# Patient Record
(demographics unavailable — no encounter records)

---

## 2024-12-23 NOTE — HISTORY OF PRESENT ILLNESS
[No Pertinent Cardiac History] : no history of aortic stenosis, atrial fibrillation, coronary artery disease, recent myocardial infarction, or implantable device/pacemaker [No Pertinent Pulmonary History] : no history of asthma, COPD, sleep apnea, or smoking [No Adverse Anesthesia Reaction] : no adverse anesthesia reaction in self or family member [Diabetes] : diabetes [FreeTextEntry1] : NASAL ENDOSCOPY AND TISSUE EXCISION [FreeTextEntry3] : DR MURPHY [FreeTextEntry2] : 1/9/25 [FreeTextEntry4] : COMES FOR PREOP  CC OF COUGH WITH CLEAR MUCUS FOR 3 DAYS

## 2024-12-23 NOTE — ASSESSMENT
[Patient Optimized for Surgery] : Patient optimized for surgery [No Further Testing Recommended] : no further testing recommended [Continue medications as is] : Continue current medications [As per surgery] : as per surgery [FreeTextEntry4] : 65 Y OLD FEM WITH PMX OF BIPOLAR DISORDER AND DM AT ACCEPTABLE RISK FOR SURGERY  ACUTE BRONCHITIS SX= AUGMENTIN ,BENZONATATE AND PREDNISONE 20 MG ORDERED

## 2024-12-23 NOTE — PHYSICAL EXAM
[No Acute Distress] : no acute distress [Normal Sclera/Conjunctiva] : normal sclera/conjunctiva [Normal Outer Ear/Nose] : the outer ears and nose were normal in appearance [No JVD] : no jugular venous distention [No Lymphadenopathy] : no lymphadenopathy [Dry Cough] : a dry cough was heard [Paroxysmal Cough] : a paroxysmal cough was heard [Scattered Wheezes] : scattered wheezing was heard [Soft] : abdomen soft [Non Tender] : non-tender [Normal Bowel Sounds] : normal bowel sounds [Normal Anterior Cervical Nodes] : no anterior cervical lymphadenopathy [No CVA Tenderness] : no CVA  tenderness [No Joint Swelling] : no joint swelling [No Rash] : no rash [Coordination Grossly Intact] : coordination grossly intact [No Focal Deficits] : no focal deficits [Normal Affect] : the affect was normal

## 2025-03-24 NOTE — PHYSICAL EXAM
[No Acute Distress] : no acute distress [Normal Sclera/Conjunctiva] : normal sclera/conjunctiva [Normal Outer Ear/Nose] : the outer ears and nose were normal in appearance [Normal] : normal rate, regular rhythm, normal S1 and S2 and no murmur heard [No Edema] : there was no peripheral edema [Soft] : abdomen soft [Non Tender] : non-tender [Normal Bowel Sounds] : normal bowel sounds

## 2025-03-24 NOTE — HISTORY OF PRESENT ILLNESS
[de-identified] : SEEN AT [facility] ER FOR HA;WAS RECOMM OPHTHA AND NEURO  CONCERN BSFS IN MID 60 X2  RUN OUT OF ROSUVASTATIN RECENTLY  SEEN BY ENT AND CARDIO RECENTLY

## 2025-03-24 NOTE — ASSESSMENT
[FreeTextEntry1] : 65 Y OLD FEM WITH CEDRICK= BIPOLAR= F/U PSYCH  DM = LABS IF HBA1C BELLOW 6.5 WILL DECREAE METFORMIN  DYSLIPIDEMIA = ROSUVASTATIN 5 MG AND LABS ORDRED RTO 3 M

## 2025-04-16 NOTE — ASSESSMENT
[FreeTextEntry1] : 66 Y OLD FEM WITH UNSTABLE GAIT ,BIPOLAR DISORDER= NEURO EVAL  HOME CARE FORM WILL BE FILLED AND FAX  DECREASE METFORMIN  MG DAILY  RTO 2-3 M

## 2025-04-16 NOTE — HISTORY OF PRESENT ILLNESS
[de-identified] : COMES FOR F/U  SEEN AT [facility] AND REQUESTED A NEURO REF BY [facility] LINK  NEEDS HOME CARE ORDER AND FORM TO BE FILLED TAKING DAILY IRON SUPPL ;LAST CBC 12/24 WNL

## 2025-04-16 NOTE — PHYSICAL EXAM
[No Acute Distress] : no acute distress [Normal Sclera/Conjunctiva] : normal sclera/conjunctiva [Normal Outer Ear/Nose] : the outer ears and nose were normal in appearance [No JVD] : no jugular venous distention [Normal] : normal rate, regular rhythm, normal S1 and S2 and no murmur heard [No Edema] : there was no peripheral edema [Soft] : abdomen soft [Non Tender] : non-tender [Normal Bowel Sounds] : normal bowel sounds [Normal Anterior Cervical Nodes] : no anterior cervical lymphadenopathy [No CVA Tenderness] : no CVA  tenderness [Coordination Grossly Intact] : coordination grossly intact [No Focal Deficits] : no focal deficits [Normal Affect] : the affect was normal [de-identified] : WALKS WITH CANE

## 2025-07-07 NOTE — HEALTH RISK ASSESSMENT
[Very Good] : ~his/her~  mood as very good [No] : In the past 12 months have you used drugs other than those required for medical reasons? No [No falls in past year] : Patient reported no falls in the past year [Medical reason not done] : Medical reason not done [Never] : Never [With Family] : lives with family [# of Members in Household ___] :  household currently consist of [unfilled] member(s) [Retired] : retired [Single] : single [# Of Children ___] : has [unfilled] children [Feels Safe at Home] : Feels safe at home [EyeExamDate] : 07/25 [Sexually Active] : not sexually active [ColonoscopyDate] : 2024

## 2025-07-07 NOTE — HISTORY OF PRESENT ILLNESS
[de-identified] : COME SWITH FAMILY  NO CHANGE IN HEALTH ;COLONOSOCPY 1 Y AGO ;RECENT CATARACTS SURGERY  LITTLE WALKING

## 2025-07-07 NOTE — ASSESSMENT
[FreeTextEntry1] : CPE OF 66 Y OLD FEM WITH PMX OF DM = TAKING METFORMIN 500 BID FROM OLD RX ;LAST RX WAS ONCE DAILY= WILL ASSES HBA1C TODAY  BIPOLAR DISORDER= F/U PSYCH  INCREASE WALKING  RTO 3 M  [Vaccines Reviewed] : Immunizations reviewed today. Please see immunization details in the vaccine log within the immunization flowsheet.

## 2025-07-07 NOTE — PHYSICAL EXAM
[No Acute Distress] : no acute distress [Normal Sclera/Conjunctiva] : normal sclera/conjunctiva [Normal Outer Ear/Nose] : the outer ears and nose were normal in appearance [No JVD] : no jugular venous distention [Normal] : normal rate, regular rhythm, normal S1 and S2 and no murmur heard [No Edema] : there was no peripheral edema [Soft] : abdomen soft [Non Tender] : non-tender [Normal Bowel Sounds] : normal bowel sounds [Rounded] : rounded [No Rash] : no rash [Coordination Grossly Intact] : coordination grossly intact [No Focal Deficits] : no focal deficits [Alert and Oriented x3] : oriented to person, place, and time